# Patient Record
Sex: MALE | Race: WHITE | NOT HISPANIC OR LATINO | URBAN - METROPOLITAN AREA
[De-identification: names, ages, dates, MRNs, and addresses within clinical notes are randomized per-mention and may not be internally consistent; named-entity substitution may affect disease eponyms.]

---

## 2017-10-12 ENCOUNTER — OUTPATIENT (OUTPATIENT)
Dept: OUTPATIENT SERVICES | Facility: HOSPITAL | Age: 62
LOS: 1 days | End: 2017-10-12
Payer: COMMERCIAL

## 2017-10-12 VITALS
SYSTOLIC BLOOD PRESSURE: 130 MMHG | HEART RATE: 78 BPM | OXYGEN SATURATION: 98 % | RESPIRATION RATE: 18 BRPM | DIASTOLIC BLOOD PRESSURE: 80 MMHG | HEIGHT: 71 IN | TEMPERATURE: 99 F | WEIGHT: 214.07 LBS

## 2017-10-12 DIAGNOSIS — M06.9 RHEUMATOID ARTHRITIS, UNSPECIFIED: ICD-10-CM

## 2017-10-12 DIAGNOSIS — E78.5 HYPERLIPIDEMIA, UNSPECIFIED: ICD-10-CM

## 2017-10-12 DIAGNOSIS — I10 ESSENTIAL (PRIMARY) HYPERTENSION: ICD-10-CM

## 2017-10-12 DIAGNOSIS — S83.231A COMPLEX TEAR OF MEDIAL MENISCUS, CURRENT INJURY, RIGHT KNEE, INITIAL ENCOUNTER: ICD-10-CM

## 2017-10-12 DIAGNOSIS — Z90.79 ACQUIRED ABSENCE OF OTHER GENITAL ORGAN(S): Chronic | ICD-10-CM

## 2017-10-12 DIAGNOSIS — Z01.818 ENCOUNTER FOR OTHER PREPROCEDURAL EXAMINATION: ICD-10-CM

## 2017-10-12 LAB
ANION GAP SERPL CALC-SCNC: 15 MMOL/L — SIGNIFICANT CHANGE UP (ref 5–17)
BUN SERPL-MCNC: 19 MG/DL — SIGNIFICANT CHANGE UP (ref 7–23)
CALCIUM SERPL-MCNC: 9.3 MG/DL — SIGNIFICANT CHANGE UP (ref 8.4–10.5)
CHLORIDE SERPL-SCNC: 104 MMOL/L — SIGNIFICANT CHANGE UP (ref 96–108)
CO2 SERPL-SCNC: 22 MMOL/L — SIGNIFICANT CHANGE UP (ref 22–31)
CREAT SERPL-MCNC: 0.99 MG/DL — SIGNIFICANT CHANGE UP (ref 0.5–1.3)
GLUCOSE SERPL-MCNC: 101 MG/DL — HIGH (ref 70–99)
HCT VFR BLD CALC: 40.6 % — SIGNIFICANT CHANGE UP (ref 39–50)
HGB BLD-MCNC: 13.9 G/DL — SIGNIFICANT CHANGE UP (ref 13–17)
MCHC RBC-ENTMCNC: 29.7 PG — SIGNIFICANT CHANGE UP (ref 27–34)
MCHC RBC-ENTMCNC: 34.2 GM/DL — SIGNIFICANT CHANGE UP (ref 32–36)
MCV RBC AUTO: 86.8 FL — SIGNIFICANT CHANGE UP (ref 80–100)
PLATELET # BLD AUTO: 211 K/UL — SIGNIFICANT CHANGE UP (ref 150–400)
POTASSIUM SERPL-MCNC: 4.6 MMOL/L — SIGNIFICANT CHANGE UP (ref 3.5–5.3)
POTASSIUM SERPL-SCNC: 4.6 MMOL/L — SIGNIFICANT CHANGE UP (ref 3.5–5.3)
RBC # BLD: 4.68 M/UL — SIGNIFICANT CHANGE UP (ref 4.2–5.8)
RBC # FLD: 13.1 % — SIGNIFICANT CHANGE UP (ref 10.3–14.5)
SODIUM SERPL-SCNC: 141 MMOL/L — SIGNIFICANT CHANGE UP (ref 135–145)
WBC # BLD: 7.55 K/UL — SIGNIFICANT CHANGE UP (ref 3.8–10.5)
WBC # FLD AUTO: 7.55 K/UL — SIGNIFICANT CHANGE UP (ref 3.8–10.5)

## 2017-10-12 PROCEDURE — G0463: CPT

## 2017-10-12 PROCEDURE — 85027 COMPLETE CBC AUTOMATED: CPT

## 2017-10-12 PROCEDURE — 80048 BASIC METABOLIC PNL TOTAL CA: CPT

## 2017-10-12 RX ORDER — LIDOCAINE HCL 20 MG/ML
0.2 VIAL (ML) INJECTION ONCE
Qty: 0 | Refills: 0 | Status: DISCONTINUED | OUTPATIENT
Start: 2017-10-19 | End: 2017-11-10

## 2017-10-12 RX ORDER — CELECOXIB 200 MG/1
200 CAPSULE ORAL ONCE
Qty: 0 | Refills: 0 | Status: DISCONTINUED | OUTPATIENT
Start: 2017-10-19 | End: 2017-11-10

## 2017-10-12 RX ORDER — SODIUM CHLORIDE 9 MG/ML
3 INJECTION INTRAMUSCULAR; INTRAVENOUS; SUBCUTANEOUS EVERY 8 HOURS
Qty: 0 | Refills: 0 | Status: DISCONTINUED | OUTPATIENT
Start: 2017-10-19 | End: 2017-11-10

## 2017-10-12 NOTE — H&P PST ADULT - PMH
HTN (hypertension)  x 10 years, controlled  Hyperlipidemia    Rheumatoid arthritis HTN (hypertension)  x 10 years, controlled  Hyperlipidemia    Prostate CA  2014  Rheumatoid arthritis

## 2017-10-12 NOTE — H&P PST ADULT - NSANTHOSAYNRD_GEN_A_CORE
No. VANESSA screening performed.  STOP BANG Legend: 0-2 = LOW Risk; 3-4 = INTERMEDIATE Risk; 5-8 = HIGH Risk

## 2017-10-19 ENCOUNTER — OUTPATIENT (OUTPATIENT)
Dept: OUTPATIENT SERVICES | Facility: HOSPITAL | Age: 62
LOS: 1 days | End: 2017-10-19
Payer: COMMERCIAL

## 2017-10-19 VITALS
RESPIRATION RATE: 16 BRPM | TEMPERATURE: 98 F | HEART RATE: 72 BPM | SYSTOLIC BLOOD PRESSURE: 139 MMHG | DIASTOLIC BLOOD PRESSURE: 76 MMHG | OXYGEN SATURATION: 100 %

## 2017-10-19 VITALS
HEART RATE: 56 BPM | WEIGHT: 214.07 LBS | RESPIRATION RATE: 16 BRPM | DIASTOLIC BLOOD PRESSURE: 74 MMHG | OXYGEN SATURATION: 98 % | SYSTOLIC BLOOD PRESSURE: 130 MMHG | TEMPERATURE: 98 F | HEIGHT: 71 IN

## 2017-10-19 DIAGNOSIS — Z90.79 ACQUIRED ABSENCE OF OTHER GENITAL ORGAN(S): Chronic | ICD-10-CM

## 2017-10-19 DIAGNOSIS — M06.9 RHEUMATOID ARTHRITIS, UNSPECIFIED: ICD-10-CM

## 2017-10-19 DIAGNOSIS — S83.231A COMPLEX TEAR OF MEDIAL MENISCUS, CURRENT INJURY, RIGHT KNEE, INITIAL ENCOUNTER: ICD-10-CM

## 2017-10-19 PROCEDURE — 29881 ARTHRS KNE SRG MNISECTMY M/L: CPT | Mod: RT

## 2017-10-19 RX ORDER — SODIUM CHLORIDE 9 MG/ML
1000 INJECTION, SOLUTION INTRAVENOUS
Qty: 0 | Refills: 0 | Status: DISCONTINUED | OUTPATIENT
Start: 2017-10-19 | End: 2017-11-10

## 2017-10-19 RX ORDER — ASPIRIN/CALCIUM CARB/MAGNESIUM 324 MG
1 TABLET ORAL
Qty: 0 | Refills: 0 | COMMUNITY

## 2017-10-19 RX ORDER — SIMVASTATIN 20 MG/1
1 TABLET, FILM COATED ORAL
Qty: 0 | Refills: 0 | COMMUNITY

## 2017-10-19 RX ORDER — ACETAMINOPHEN 500 MG
0 TABLET ORAL
Qty: 0 | Refills: 0 | COMMUNITY

## 2017-10-19 RX ORDER — METHOTREXATE 2.5 MG/1
3 TABLET ORAL
Qty: 0 | Refills: 0 | COMMUNITY

## 2017-10-19 RX ORDER — CELECOXIB 200 MG/1
200 CAPSULE ORAL ONCE
Qty: 0 | Refills: 0 | Status: DISCONTINUED | OUTPATIENT
Start: 2017-10-19 | End: 2017-11-10

## 2017-10-19 RX ORDER — VALSARTAN 80 MG/1
1 TABLET ORAL
Qty: 0 | Refills: 0 | COMMUNITY

## 2017-10-19 RX ORDER — HYDROXYCHLOROQUINE SULFATE 200 MG
1 TABLET ORAL
Qty: 0 | Refills: 0 | COMMUNITY

## 2017-10-19 RX ORDER — OXYCODONE HYDROCHLORIDE 5 MG/1
5 TABLET ORAL ONCE
Qty: 0 | Refills: 0 | Status: DISCONTINUED | OUTPATIENT
Start: 2017-10-19 | End: 2017-10-19

## 2017-10-19 RX ORDER — ACETAMINOPHEN 500 MG
975 TABLET ORAL ONCE
Qty: 0 | Refills: 0 | Status: COMPLETED | OUTPATIENT
Start: 2017-10-19 | End: 2017-10-19

## 2017-10-19 RX ORDER — ONDANSETRON 8 MG/1
4 TABLET, FILM COATED ORAL ONCE
Qty: 0 | Refills: 0 | Status: DISCONTINUED | OUTPATIENT
Start: 2017-10-19 | End: 2017-11-10

## 2017-10-19 NOTE — BRIEF OPERATIVE NOTE - PROCEDURE
<<-----Click on this checkbox to enter Procedure Partial meniscectomy of knee  10/19/2017  arthroscopic partial medial meniscetomy of right knee  Active  [id number]

## 2017-10-19 NOTE — ASU DISCHARGE PLAN (ADULT/PEDIATRIC). - NOTIFY
Inability to Tolerate Liquids or Foods/Fever greater than 101 Swelling that continues/Persistent Nausea and Vomiting/Inability to Tolerate Liquids or Foods/Unable to Urinate/Fever greater than 101/Bleeding that does not stop/Pain not relieved by Medications

## 2017-10-19 NOTE — ASU PATIENT PROFILE, ADULT - PMH
HTN (hypertension)  x 10 years, controlled  Hyperlipidemia    Prostate CA  2014  Rheumatoid arthritis

## 2017-10-19 NOTE — ASU DISCHARGE PLAN (ADULT/PEDIATRIC). - MEDICATION SUMMARY - MEDICATIONS TO TAKE
I will START or STAY ON the medications listed below when I get home from the hospital:    Tylenol 500 mg oral tablet  -- Indication: For mild pain    Aspirin Enteric Coated 325 mg oral delayed release tablet  -- 1 tab(s) by mouth once a day for 2 weeks post op  -- Indication: For antiplatelet therapy for 2 weeks post op    valsartan 80 mg oral capsule  -- 1 tab(s) by mouth once a day  -- Indication: For Cardiovascular agent    Zocor 20 mg oral tablet  -- 1 tab(s) by mouth once a day (at bedtime)  -- Indication: For antihyperlipidemic agent    hydroxychloroquine 200 mg oral tablet  -- 1 tab(s) by mouth once a day  -- Indication: For antinfective agent    methotrexate 2.5 mg oral tablet  -- 3 tab(s) by mouth once a week  -- Indication: For antineoplastic agent

## 2017-10-27 PROBLEM — Z00.00 ENCOUNTER FOR PREVENTIVE HEALTH EXAMINATION: Status: ACTIVE | Noted: 2017-10-27

## 2017-10-28 ENCOUNTER — TRANSCRIPTION ENCOUNTER (OUTPATIENT)
Age: 62
End: 2017-10-28

## 2017-11-07 ENCOUNTER — APPOINTMENT (OUTPATIENT)
Dept: ORTHOPEDIC SURGERY | Facility: CLINIC | Age: 62
End: 2017-11-07
Payer: COMMERCIAL

## 2017-11-07 VITALS — HEIGHT: 71 IN | WEIGHT: 215 LBS | BODY MASS INDEX: 30.1 KG/M2

## 2017-11-07 DIAGNOSIS — M16.11 UNILATERAL PRIMARY OSTEOARTHRITIS, RIGHT HIP: ICD-10-CM

## 2017-11-07 PROCEDURE — 99204 OFFICE O/P NEW MOD 45 MIN: CPT

## 2017-11-07 PROCEDURE — 73502 X-RAY EXAM HIP UNI 2-3 VIEWS: CPT | Mod: RT

## 2018-10-16 ENCOUNTER — OTHER (OUTPATIENT)
Age: 63
End: 2018-10-16

## 2020-03-14 ENCOUNTER — TRANSCRIPTION ENCOUNTER (OUTPATIENT)
Age: 65
End: 2020-03-14

## 2022-08-14 ENCOUNTER — EMERGENCY (EMERGENCY)
Facility: HOSPITAL | Age: 67
LOS: 1 days | Discharge: HOME | End: 2022-08-14
Attending: EMERGENCY MEDICINE
Payer: COMMERCIAL

## 2022-08-14 VITALS
DIASTOLIC BLOOD PRESSURE: 78 MMHG | TEMPERATURE: 98 F | HEIGHT: 71 IN | HEART RATE: 67 BPM | RESPIRATION RATE: 18 BRPM | SYSTOLIC BLOOD PRESSURE: 170 MMHG | WEIGHT: 199.96 LBS | OXYGEN SATURATION: 97 %

## 2022-08-14 VITALS
TEMPERATURE: 98 F | RESPIRATION RATE: 19 BRPM | HEART RATE: 58 BPM | DIASTOLIC BLOOD PRESSURE: 90 MMHG | OXYGEN SATURATION: 100 % | SYSTOLIC BLOOD PRESSURE: 167 MMHG

## 2022-08-14 DIAGNOSIS — Z90.79 ACQUIRED ABSENCE OF OTHER GENITAL ORGAN(S): Chronic | ICD-10-CM

## 2022-08-14 PROBLEM — E78.5 HYPERLIPIDEMIA, UNSPECIFIED: Chronic | Status: ACTIVE | Noted: 2017-10-12

## 2022-08-14 PROBLEM — C61 MALIGNANT NEOPLASM OF PROSTATE: Chronic | Status: ACTIVE | Noted: 2017-10-12

## 2022-08-14 PROBLEM — M06.9 RHEUMATOID ARTHRITIS, UNSPECIFIED: Chronic | Status: ACTIVE | Noted: 2017-10-12

## 2022-08-14 PROBLEM — I10 ESSENTIAL (PRIMARY) HYPERTENSION: Chronic | Status: ACTIVE | Noted: 2017-10-12

## 2022-08-14 PROCEDURE — 99284 EMERGENCY DEPT VISIT MOD MDM: CPT | Mod: 25

## 2022-08-14 PROCEDURE — 99053 MED SERV 10PM-8AM 24 HR FAC: CPT

## 2022-08-14 PROCEDURE — 96372 THER/PROPH/DIAG INJ SC/IM: CPT

## 2022-08-14 PROCEDURE — 99284 EMERGENCY DEPT VISIT MOD MDM: CPT

## 2022-08-14 RX ORDER — OXYCODONE HYDROCHLORIDE 5 MG/1
5 TABLET ORAL ONCE
Refills: 0 | Status: DISCONTINUED | OUTPATIENT
Start: 2022-08-14 | End: 2022-08-14

## 2022-08-14 RX ORDER — DIAZEPAM 5 MG
1 TABLET ORAL ONCE
Refills: 0 | Status: DISCONTINUED | OUTPATIENT
Start: 2022-08-14 | End: 2022-08-14

## 2022-08-14 RX ORDER — KETOROLAC TROMETHAMINE 30 MG/ML
15 SYRINGE (ML) INJECTION ONCE
Refills: 0 | Status: DISCONTINUED | OUTPATIENT
Start: 2022-08-14 | End: 2022-08-14

## 2022-08-14 RX ADMIN — OXYCODONE HYDROCHLORIDE 5 MILLIGRAM(S): 5 TABLET ORAL at 11:10

## 2022-08-14 RX ADMIN — OXYCODONE HYDROCHLORIDE 5 MILLIGRAM(S): 5 TABLET ORAL at 10:50

## 2022-08-14 RX ADMIN — Medication 1 MILLIGRAM(S): at 07:41

## 2022-08-14 RX ADMIN — Medication 15 MILLIGRAM(S): at 07:41

## 2022-08-14 NOTE — ED PROVIDER NOTE - NSFOLLOWUPINSTRUCTIONS_ED_ALL_ED_FT
you were seen in the ER today for left leg pain.  you can continue to take cyclobenzaprine along with ibuprofen as prescribed for pain  it is important to follow up with your PCP regarding today's visit.   if your symptoms worsen, persist, or if any new symptoms develop, such as swelling, redness, numbness, tingling, chest pain, shortness of breath, dizziness, or if you experience any signs of distress, return to the ER as soon as possible.

## 2022-08-14 NOTE — ED PROVIDER NOTE - CLINICAL SUMMARY MEDICAL DECISION MAKING FREE TEXT BOX
Attending Tasneem:  67 y/o mPmHx HTN, HLD, Prostate Ca s/p prostatectomy and RA presents presenting w/ five days of l leg pain in his left thigh that radiates down to left calf, has not had before, does not remember straining it, seen by his pcp for this had us but does not know results, feels better w/ walking, no hx of dvt/pe, no cp, no back pain no sob, no palpitations, no ha, no f/c, no redness, no known trauma, no testicular pain, afebrile vitals stable, non toxic, seen ambulating, no noticeable redness/swelling, pos ttp over left calf, pos thigh ttp, no testicular ttp, nv intact distally. seems most consistent w/ muscle strain, no concern for arterial injury given bounding pulses, no erythema, no systemic symptoms to suggest infection, no skin abnormalities, plan for pain control and re evaluate.

## 2022-08-14 NOTE — ED ADULT NURSE REASSESSMENT NOTE - NS ED NURSE REASSESS COMMENT FT1
Report received from Shy FLNAAGAN. Pt AAOx4, NAD, resp nonlabored, skin warm/dry, resting comfortably in bed with family at bedside. Pt c/o left leg pain. Pt given po pain medication, pending pain reassessment. Safety maintained.

## 2022-08-14 NOTE — ED ADULT NURSE NOTE - OBJECTIVE STATEMENT
66 yr old male A&Ox4, presenting to the ED ambulatory complaining of left leg pain. Pt states the pain is in the shin area, behind the knee and in the groin. States the pain started Monday and has been 8/10 at rest and with activity. Pt followed up with a PCP who prescribed him cyclobenzaprine with no improvement. Pt denies chest pain/SOB/N/V/lightheadedness/dizziness and hx of DVT's. PMH of RA and CV 2 weeks ago. Pt's calm and answers questions appropriately, breathing unlabored, abd is soft and nontender, b/l lower legs equal in size, skin color and temperature, dorsal pulses palpable b/l, no redness noted. Safety and comfort measures provided, bed locked and in lowest position, side rails up for safety. Call bell within reach. Wife at bedside. Awaiting MD soriano.

## 2022-08-14 NOTE — ED ADULT NURSE NOTE - HIV OFFER
Opt out Metronidazole Pregnancy And Lactation Text: This medication is Pregnancy Category B and considered safe during pregnancy.  It is also excreted in breast milk.

## 2022-08-14 NOTE — ED PROVIDER NOTE - ATTENDING CONTRIBUTION TO CARE
MD Boateng:  patient seen and evaluated personally.   I agree with the History & Physical,  Impression & Plan other than what was detailed in my note.  MD Boateng  see mdm

## 2022-08-14 NOTE — ED PROVIDER NOTE - SHIFT CHANGE DETAILS
Attending MD Gonzalez: 66M w LLE pain, improved with walking, had US LLE no results, suspect MSK, pain control, Luisa/Toradol, reassess, ?Rx Luisa

## 2022-08-14 NOTE — ED PROVIDER NOTE - OBJECTIVE STATEMENT
67 y/o M with PmHx HTN, HLD, Prostate Ca s/p prostatectomy and RA presents with left lower leg pain. Starting 5 days ago patient noticed persistent L leg pain that is waxing and waining in nature. He was seen by PCP 2 days ago and prescribed cyclobenzaprine. US was LLE doppler was ordered, patient did not receive results. He has been taking this medication along with ibuprofen with no relief of symptoms. Denies pain with ROM, pain with ambulation, trauma, fevers, chills, body aches, testicular pain, testicular swelling.

## 2022-08-14 NOTE — ED PROVIDER NOTE - PATIENT PORTAL LINK FT
You can access the FollowMyHealth Patient Portal offered by [facility] by registering at the following website: [email or web address]. By joining [facility]’s FollowMyHealth portal, you will also be able to view your health information using other applications (apps) compatible with our system.

## 2022-08-14 NOTE — ED PROVIDER NOTE - PHYSICAL EXAMINATION
Constitutional: AOx3, no acute distress  Cardiovascular: normal rate and rhythm, normal S1/S2, no murmurs, thrills or gallops  Respiratory: equal breath sounds B/L, no wheezing, rales or rhonchi   GI: abdomen soft, nontender, nondistended, no CVA tenderness  : no testicular pain or swelling, no inguinal hernia palpated on examination  MSK: 5/5 motor strength B/L LE, normal hip ROM B/L, no LE pain on palpation, no LE edema  Derm: no erythema, lacerations, ecchymosis of LE

## 2022-08-14 NOTE — ED ADULT NURSE REASSESSMENT NOTE - NS ED NURSE REASSESS COMMENT FT1
Report received from MASHA LOCKETT. Pt is AxOx4, sitting restfully on stretcher with wife at bedside. Pt stated to feel much better after oxy. Pt denies any needs. No further RN interventions are needed at this time.

## 2022-08-14 NOTE — ED PROVIDER NOTE - NSICDXPASTMEDICALHX_GEN_ALL_CORE_FT
PAST MEDICAL HISTORY:  HTN (hypertension) x 10 years, controlled    Hyperlipidemia     Prostate CA 2014    Rheumatoid arthritis

## 2022-08-14 NOTE — ED PROVIDER NOTE - PROGRESS NOTE DETAILS
Grayson Buitrago PA: patient signed out by previous team. patient reports improvement of symptoms. patient able to ambulate. advised to f/u with his pcp regarding today's visit. patient in agreement with plan. stable for discharge. discussed with Dr. Gonzalez. Attending MD Gonzalez: Patient previously examined by this MD after sign out, no midline tenderness to palpation, +L SLR.  Offered Oxy with improvement.

## 2023-05-11 NOTE — H&P PST ADULT - PRO INTERPRETER NEED 2
Group Topic: BH Coping Skills Education    Date: 5/11/2023  Start Time: 1300  End Time: 1345  Facilitators: Rosenda Navarro MS; Vivek Chavez    Pt was recruited for group but did not attend. Efforts to encourage participation in programming on the unit will continue.   Rosenda Navarro, MS         English